# Patient Record
Sex: MALE | Race: WHITE
[De-identification: names, ages, dates, MRNs, and addresses within clinical notes are randomized per-mention and may not be internally consistent; named-entity substitution may affect disease eponyms.]

---

## 2020-12-11 ENCOUNTER — HOSPITAL ENCOUNTER (OUTPATIENT)
Dept: HOSPITAL 38 - CC.SDS | Age: 40
End: 2020-12-11
Attending: FAMILY MEDICINE
Payer: MEDICARE

## 2020-12-11 DIAGNOSIS — F41.9: ICD-10-CM

## 2020-12-11 DIAGNOSIS — K29.70: ICD-10-CM

## 2020-12-11 DIAGNOSIS — Z20.828: ICD-10-CM

## 2020-12-11 DIAGNOSIS — G89.29: ICD-10-CM

## 2020-12-11 DIAGNOSIS — E78.5: ICD-10-CM

## 2020-12-11 DIAGNOSIS — K25.9: ICD-10-CM

## 2020-12-11 DIAGNOSIS — K21.00: ICD-10-CM

## 2020-12-11 DIAGNOSIS — K64.8: ICD-10-CM

## 2020-12-11 DIAGNOSIS — K62.89: Primary | ICD-10-CM

## 2020-12-11 DIAGNOSIS — Z01.812: ICD-10-CM

## 2020-12-11 NOTE — OR
DATE OF OPERATION:  12/11/2020

 

PREOPERATIVE DIAGNOSIS:

1. CHRONIC GASTROESOPHAGEAL REFLUX DISEASE.

2. CHRONIC ABDOMINAL PAIN.

3. PAINFUL DEFECATION.

 

POSTOPERATIVE DIAGNOSIS:

1. CHRONIC GASTROESOPHAGEAL REFLUX DISEASE.

2. CHRONIC ABDOMINAL PAIN.

3. PAINFUL DEFECATION.

 

SURGEON:  Burton Yeager MD

 

PROCEDURE:

1. DIAGNOSTIC EGD WITH BIOPSIES X3, CARLOS A.

2. FULL-LENGTH COLONOSCOPY.

 

ANESTHESIA:  MAC.

 

COMPLICATIONS:  None.

 

SPECIMEN:

1. Antral biopsy x2.

2. Antral CARLOS A.

3. Distal esophageal biopsy x1.

 

FINDINGS:

1. Full-length EGD.

2. Moderate active gastritis with multiple small ulcers, distal antrum.

3. Reflux esophagitis grade 1.

4. Essentially normal full-length colonoscopy.

5. Internal hemorrhoids.

6. Extremely poor bowel prep.

 

RECOMMENDATIONS:  The patient will be placed on Dexilant for his reflux.  He is

counseled on nicotine, caffeine, alcohol.  Could consider followup colonoscopy

if needed, although there was a lot of stool, most or any bigger lesions I think

would have been seen, cannot rule out smaller lesions not being found.  The

patient does have internal hemorrhoid disease, but I could see no active

fissures and there are no signs of proctitis.  He will follow up with Dr. Mcarthur

in that regard.

 

INDICATIONS:  The patient had been having some ongoing issues with chronic

heartburn and having some abdominal pain and painful defecation.  Dr. Mcarthur sent

him for both upper and lower endoscopy.

 

DESCRIPTION OF PROCEDURE:  The patient was prepped and draped, placed in the

left lateral decubitus position.  A lubricated Olympus gastroscope was inserted

over a bit, advanced to cricopharyngeus area, and easily intubated in the

esophagus.  The esophageal lining was benign until its most distal portion where

patient has grade 1 reflux esophagitis.  No hernia is present.  Spontaneous GERD

was seen.  There were some very small linear ulcerations and biopsy was taken of

that area.  No signs of Asher's changes or stricturing.  The scope was

advanced into the stomach, through the pylorus, and into the second portion of

the duodenum.  This and the duodenal bulb were benign.  The scope was brought

back into the stomach and retroflexed.  The upper fundus and cardia were

unremarkable.  Upon straightening, the rest of the fundus was benign.  The

patient has diffuse active gastritis of the antrum, more severe in its distal

portion.  There were multiple focal erosions/ulcerations.  We did biopsy 2 of

these and took a CLOtest.  Air was then suctioned and the scope removed without

complication.

 

A lubricated Olympus colonoscope was then inserted and with ease advanced to the

cecum.  The patient unfortunately had a very poor prep.  A lot of solid stool

throughout most of his colon.  Some of these areas were able to be irrigated.

Unfortunately, many were not.  Upon withdrawal throughout the length of the

colon, I could find no polyps, masses, ulceration, or bleeding sites.  No

vascular abnormalities or signs of colitis.  There were no diverticula that I

could see.  I did try to irrigate the rectal vault and suctioned as best as

possible, so I could get a good look here.  I could not find any active

proctitis down there.  Retroflexion was accomplished.  He had some internal

hemorrhoids, but I could not see any obvious fissures.  There may have been a

small amount of perianal inflammation.  Air was then suctioned and the scope

removed without complication.

 

JUANCARLOS/EASTON

DD:  12/11/2020 09:42:19

DT:  12/11/2020 10:24:20

Job #:  [id number]